# Patient Record
Sex: FEMALE | Employment: FULL TIME | URBAN - METROPOLITAN AREA
[De-identification: names, ages, dates, MRNs, and addresses within clinical notes are randomized per-mention and may not be internally consistent; named-entity substitution may affect disease eponyms.]

---

## 2024-10-18 ENCOUNTER — OFFICE VISIT (OUTPATIENT)
Age: 39
End: 2024-10-18
Payer: COMMERCIAL

## 2024-10-18 VITALS
DIASTOLIC BLOOD PRESSURE: 70 MMHG | TEMPERATURE: 98.2 F | HEIGHT: 65 IN | HEART RATE: 99 BPM | OXYGEN SATURATION: 100 % | SYSTOLIC BLOOD PRESSURE: 120 MMHG | BODY MASS INDEX: 22.66 KG/M2 | WEIGHT: 136 LBS

## 2024-10-18 DIAGNOSIS — J45.40 MODERATE PERSISTENT ASTHMATIC BRONCHITIS WITHOUT COMPLICATION: ICD-10-CM

## 2024-10-18 DIAGNOSIS — J45.20 MILD INTERMITTENT ASTHMA, UNSPECIFIED WHETHER COMPLICATED: Primary | ICD-10-CM

## 2024-10-18 PROBLEM — J45.909 ASTHMATIC BRONCHITIS WITHOUT COMPLICATION: Status: ACTIVE | Noted: 2024-10-18

## 2024-10-18 PROCEDURE — 99204 OFFICE O/P NEW MOD 45 MIN: CPT | Performed by: INTERNAL MEDICINE

## 2024-10-18 PROCEDURE — 90471 IMMUNIZATION ADMIN: CPT

## 2024-10-18 PROCEDURE — 90673 RIV3 VACCINE NO PRESERV IM: CPT

## 2024-10-18 RX ORDER — LORATADINE 10 MG/1
10 TABLET ORAL DAILY
COMMUNITY

## 2024-10-18 RX ORDER — BUDESONIDE AND FORMOTEROL FUMARATE DIHYDRATE 80; 4.5 UG/1; UG/1
2 AEROSOL RESPIRATORY (INHALATION) AS NEEDED
COMMUNITY
End: 2024-10-18

## 2024-10-18 RX ORDER — AZELASTINE HYDROCHLORIDE 0.5 MG/ML
1 SOLUTION/ DROPS OPHTHALMIC 2 TIMES DAILY
COMMUNITY

## 2024-10-18 RX ORDER — ALBUTEROL SULFATE 90 UG/1
2 INHALANT RESPIRATORY (INHALATION) EVERY 6 HOURS PRN
Qty: 18 G | Refills: 5 | Status: SHIPPED | OUTPATIENT
Start: 2024-10-18

## 2024-10-18 RX ORDER — BUDESONIDE AND FORMOTEROL FUMARATE DIHYDRATE 160; 4.5 UG/1; UG/1
2 AEROSOL RESPIRATORY (INHALATION) 2 TIMES DAILY
Start: 2024-10-18

## 2024-10-18 RX ORDER — ALBUTEROL SULFATE 90 UG/1
2 INHALANT RESPIRATORY (INHALATION) EVERY 6 HOURS PRN
COMMUNITY
End: 2024-10-18 | Stop reason: SDUPTHER

## 2024-10-18 NOTE — ASSESSMENT & PLAN NOTE
Bekah and I discussed her history.  She has had no pulmonary problems up until 2022.  I think the episode of COVID caused some airway inflammation that was propagated by multiple subsequent infections and now she is suffering from asthmatic bronchitis.  I would like her to take Symbicort 2 puffs twice a day and reinforced proper inhaler technique.  I asked her to rinse her mouth out after each dose and follow-up with me in 3 months to consider de-escalation.  If her symptoms are stable and she has had no recurrent issues we can discuss de-escalation of Symbicort and wean her off of this.  She may in the future need to be on inhaled steroids surrounding URI or other triggers.  She certainly seems to have allergies per her allergy panel as well as eosinophilia setting her up for chronic asthma.

## 2024-10-18 NOTE — PROGRESS NOTES
Ambulatory Visit  Name: Elba Marte      : 1985      MRN: [id number]  Encounter Provider: Brooklyn Kern DO  Encounter Date: 10/18/2024   Encounter department: [facility] [place]    Assessment & Plan  Mild intermittent asthma, unspecified whether complicated    Orders:    POCT spirometry    budesonide-formoterol (Symbicort) 160-4.5 mcg/act inhaler; Inhale 2 puffs 2 (two) times a day Rinse mouth after use.    influenza vaccine, recombinant, PF, 0.5 mL IM (Flublok)    albuterol (PROVENTIL HFA,VENTOLIN HFA) 90 mcg/act inhaler; Inhale 2 puffs every 6 (six) hours as needed for wheezing    Moderate persistent asthmatic bronchitis without complication  Bekah and I discussed her history.  She has had no pulmonary problems up until .  I think the episode of COVID caused some airway inflammation that was propagated by multiple subsequent infections and now she is suffering from asthmatic bronchitis.  I would like her to take Symbicort 2 puffs twice a day and reinforced proper inhaler technique.  I asked her to rinse her mouth out after each dose and follow-up with me in 3 months to consider de-escalation.  If her symptoms are stable and she has had no recurrent issues we can discuss de-escalation of Symbicort and wean her off of this.  She may in the future need to be on inhaled steroids surrounding URI or other triggers.  She certainly seems to have allergies per her allergy panel as well as eosinophilia setting her up for chronic asthma.           History of Present Illness     Elba Marte is a 39 y.o. female who presents for evaluation of airway URIs and pneumonia.  She had no past medical history up until  when she developed COVID.  Over the past 18 months she has had 3-4 episodes of bronchitis, influenza and 1 severe pneumonia after influenza that left her almost hospitalized.  She has a chronic cough and occasional wheezing.  She lives at home with 3 pets 2 dogs and a cat and is  "a non-smoker.  She works from home and is exposed to her allergens throughout the day and evening with known cat and dog allergies.    History obtained from : patient  Review of Systems   Constitutional: Negative.  Negative for appetite change, fever and unexpected weight change.   HENT:  Positive for postnasal drip, rhinorrhea and sneezing. Negative for ear pain, sore throat and trouble swallowing.    Eyes: Negative.    Respiratory:  Positive for cough and wheezing. Negative for shortness of breath.    Cardiovascular: Negative.  Negative for chest pain and leg swelling.   Gastrointestinal: Negative.    Endocrine: Negative.    Genitourinary: Negative.    Musculoskeletal: Negative.  Negative for myalgias.   Allergic/Immunologic: Negative.    Neurological: Negative.  Negative for headaches.   Hematological: Negative.      Medical History Reviewed by provider this encounter:           Objective     /70 (BP Location: Left arm, Patient Position: Sitting, Cuff Size: Standard)   Pulse 99   Temp 98.2 °F (36.8 °C) (Tympanic)   Ht 5' 5\" (1.651 m)   Wt 61.7 kg (136 lb)   SpO2 100%   BMI 22.63 kg/m²     Physical Exam  Constitutional:       Appearance: She is well-developed.   HENT:      Head: Normocephalic and atraumatic.   Eyes:      Pupils: Pupils are equal, round, and reactive to light.   Cardiovascular:      Rate and Rhythm: Normal rate and regular rhythm.      Heart sounds: No murmur heard.  Pulmonary:      Effort: Pulmonary effort is normal. No respiratory distress.      Breath sounds: Normal breath sounds. No wheezing or rales.   Abdominal:      Palpations: Abdomen is soft.   Musculoskeletal:      Cervical back: Normal range of motion and neck supple.   Skin:     General: Skin is warm and dry.   Neurological:      Mental Status: She is alert and oriented to person, place, and time.       Administrative Statements   I have spent a total time of 40 minutes in caring for this patient on the day of the " visit/encounter including Diagnostic results, Prognosis, Patient and family education, Counseling / Coordination of care, and Reviewing / ordering tests, medicine, procedures  .  Answers submitted by the patient for this visit:  Pulmonology Questionnaire (Submitted on 10/16/2024)  Chief Complaint: Primary symptoms  Chronicity: recurrent  When did you first notice your symptoms?: more than 1 year ago  How often do your symptoms occur?: intermittently  Since you first noticed this problem, how has it changed?: gradually improving  Do you have shortness of breath that occurs with effort or exertion?: Yes  Do you have ear congestion?: No  Do you have heartburn?: No  Do you have fatigue?: No  Do you have nasal congestion?: Yes  Do you have shortness of breath when lying flat?: No  Do you have shortness of breath when you wake up?: No  Do you have sweats?: No  Have you experienced weight loss?: No  Which of the following makes your symptoms worse?: animal exposure, change in weather, exercise, pollen  Which of the following makes your symptoms better?: oral steroids, OTC inhaler, steroid inhaler  Risk factors for lung disease: animal exposure

## 2025-01-14 ENCOUNTER — OFFICE VISIT (OUTPATIENT)
Age: 40
End: 2025-01-14
Payer: COMMERCIAL

## 2025-01-14 VITALS
HEART RATE: 74 BPM | WEIGHT: 134 LBS | BODY MASS INDEX: 22.33 KG/M2 | DIASTOLIC BLOOD PRESSURE: 78 MMHG | OXYGEN SATURATION: 98 % | TEMPERATURE: 97.9 F | SYSTOLIC BLOOD PRESSURE: 122 MMHG | HEIGHT: 65 IN

## 2025-01-14 DIAGNOSIS — J45.40 MODERATE PERSISTENT ASTHMATIC BRONCHITIS WITHOUT COMPLICATION: Primary | ICD-10-CM

## 2025-01-14 PROCEDURE — 99214 OFFICE O/P EST MOD 30 MIN: CPT | Performed by: INTERNAL MEDICINE

## 2025-01-14 NOTE — PROGRESS NOTES
"Name: Elba Marte      : 1985      MRN: [id number]  Encounter Provider: Brooklyn Kern DO  Encounter Date: 2025   [facility]  :  Assessment & Plan  Moderate persistent asthmatic bronchitis without complication  Elba feels much better is essentially asymptomatic since starting Symbicort.  She takes it very regularly and does not miss a dose.  We talked about de-escalation of therapy and agreed on 1 puff twice a day for the next 3 months.  She will come back in 3 months at which time we will check an FeNO and decide on further de-escalation but versus continuing care.  I reviewed her chest x-ray lab work and vaccination record.             History of Present Illness   primary symptoms  Pertinent negatives include no chest pain or coughing.     Elba Marte is a 39 y.o. female who presents for follow-up of her asthmatic bronchitis.  Since last visit she has been taking Symbicort 2 puffs twice a day and has had resolution of her symptoms.  She denies the use of any rescue inhaler no wheezing or shortness of breath.  History obtained from: patient    Review of Systems   Constitutional: Negative.  Negative for unexpected weight change.   HENT: Negative.  Negative for postnasal drip.    Eyes: Negative.    Respiratory: Negative.  Negative for cough, shortness of breath and wheezing.    Cardiovascular: Negative.  Negative for chest pain and leg swelling.   Gastrointestinal: Negative.    Endocrine: Negative.    Genitourinary: Negative.    Musculoskeletal: Negative.    Allergic/Immunologic: Negative.    Neurological: Negative.    Hematological: Negative.      Medical History Reviewed by provider this encounter:     .     Objective   /78 (BP Location: Left arm, Patient Position: Sitting, Cuff Size: Standard)   Pulse 74   Temp 97.9 °F (36.6 °C) (Tympanic)   Ht 5' 5\" (1.651 m)   Wt 60.8 kg (134 lb)   SpO2 98%   BMI 22.30 kg/m²      Physical " Exam  Constitutional:       Appearance: She is well-developed.   HENT:      Head: Normocephalic and atraumatic.   Eyes:      Pupils: Pupils are equal, round, and reactive to light.   Cardiovascular:      Rate and Rhythm: Normal rate and regular rhythm.      Heart sounds: No murmur heard.  Pulmonary:      Effort: Pulmonary effort is normal. No respiratory distress.      Breath sounds: Normal breath sounds. No wheezing or rales.   Abdominal:      Palpations: Abdomen is soft.   Musculoskeletal:      Cervical back: Normal range of motion and neck supple.   Skin:     General: Skin is warm and dry.   Neurological:      Mental Status: She is alert and oriented to person, place, and time.         Administrative Statements   I have spent a total time of 30 minutes in caring for this patient on the day of the visit/encounter including Diagnostic results, Prognosis, Importance of tx compliance, Documenting in the medical record, and Obtaining or reviewing history  .   Answers submitted by the patient for this visit:  Pulmonology Questionnaire (Submitted on 1/7/2025)  Chief Complaint: Primary symptoms  Chronicity: chronic  When did you first notice your symptoms?: more than 1 year ago  How often do your symptoms occur?: rarely  Since you first noticed this problem, how has it changed?: gradually improving  Do you have shortness of breath that occurs with effort or exertion?: No  Do you have ear congestion?: No  Do you have heartburn?: No  Do you have fatigue?: No  Do you have nasal congestion?: No  Do you have shortness of breath when lying flat?: No  Do you have shortness of breath when you wake up?: No  Do you have sweats?: No  Have you experienced weight loss?: No  Which of the following makes your symptoms worse?: nothing  Which of the following makes your symptoms better?: steroid inhaler  Risk factors for lung disease: animal exposure

## 2025-01-14 NOTE — ASSESSMENT & PLAN NOTE
Elba feels much better is essentially asymptomatic since starting Symbicort.  She takes it very regularly and does not miss a dose.  We talked about de-escalation of therapy and agreed on 1 puff twice a day for the next 3 months.  She will come back in 3 months at which time we will check an FeNO and decide on further de-escalation but versus continuing care.  I reviewed her chest x-ray lab work and vaccination record.

## 2025-03-05 ENCOUNTER — ANNUAL EXAM (OUTPATIENT)
Age: 40
End: 2025-03-05
Payer: COMMERCIAL

## 2025-03-05 VITALS
WEIGHT: 130.4 LBS | HEIGHT: 65 IN | BODY MASS INDEX: 21.73 KG/M2 | DIASTOLIC BLOOD PRESSURE: 64 MMHG | SYSTOLIC BLOOD PRESSURE: 118 MMHG

## 2025-03-05 DIAGNOSIS — Z12.31 ENCOUNTER FOR SCREENING MAMMOGRAM FOR MALIGNANT NEOPLASM OF BREAST: ICD-10-CM

## 2025-03-05 DIAGNOSIS — Z01.419 ENCOUNTER FOR ANNUAL ROUTINE GYNECOLOGICAL EXAMINATION: Primary | ICD-10-CM

## 2025-03-05 PROCEDURE — S0610 ANNUAL GYNECOLOGICAL EXAMINA: HCPCS | Performed by: OBSTETRICS & GYNECOLOGY

## 2025-03-05 RX ORDER — MELOXICAM 15 MG/1
15 TABLET ORAL DAILY
COMMUNITY

## 2025-03-05 NOTE — PROGRESS NOTES
Assessment/Plan:    1. Encounter for annual routine gynecological examination (Primary)      2. Encounter for screening mammogram for malignant neoplasm of breast    - Mammo screening bilateral w 3d and cad; Future        Subjective      Elba Marte is a 39 y.o. female who presents for annual exam. Periods are regular.  She denies any urinary or sexual concerns.  She has had some struggles with breast pain; trying inositol.      Current contraception: condoms  History of abnormal Pap smear: yes - ?  Regular self breast exam: yes  History of abnormal mammogram: no  History of abnormal lipids: no    Menstrual History:  OB History          4    Para   2    Term   2       0    AB   2    Living   2         SAB   1    IAB   0    Ectopic   0    Multiple   0    Live Births   2          Menarche age: 12  Patient's last menstrual period was 2025 (exact date).  Period Cycle (Days): 36  Period Duration (Days): 7-9  Period Pattern: Regular  Menstrual Flow: Moderate, Light (Heavy x 1 day)  Menstrual Control: Thin pad (Menstrual cup)  Dysmenorrhea: (!) Mild  Dysmenorrhea Symptoms: Cramping, Other (Comment) (Breast discomfort starts prior to menses onset)    Past Medical History:   Diagnosis Date    Anemia     during second pregnancy    Asthma     Miscarriage 2019    early in pregnancy       Family History   Problem Relation Age of Onset    Hypertension Mother     Hypertension Father     Colon cancer Maternal Grandmother     Stroke Maternal Grandmother     Thyroid disease Maternal Grandmother     Cancer Paternal Grandmother     Thyroid disease Sister        The following portions of the patient's history were reviewed and updated as appropriate: allergies, current medications, past family history, past medical history, past social history, past surgical history, and problem list.    Review of Systems  Pertinent items are noted in HPI.      Objective      /64 (BP Location: Left arm, Patient Position:  "Sitting, Cuff Size: Large)   Ht 5' 4.75\" (1.645 m)   Wt 59.1 kg (130 lb 6.4 oz)   LMP 02/24/2025 (Exact Date)   BMI 21.87 kg/m²     General:   alert and oriented, in no acute distress   Heart:  Breasts: regular rate and rhythm  appear normal, no suspicious masses, no skin or nipple changes or axillary nodes.   Lungs: Effort normal   Abdomen: soft, non-tender, without masses or organomegaly   Vulva: normal   Vagina: normal mucosa   Cervix: no lesions   Uterus: normal size, mobile, non-tender   Adnexa:  Bladder: normal adnexa and no mass, fullness, tenderness  Non-tender, no prolapse               "

## 2025-04-29 ENCOUNTER — OFFICE VISIT (OUTPATIENT)
Age: 40
End: 2025-04-29
Payer: COMMERCIAL

## 2025-04-29 VITALS
DIASTOLIC BLOOD PRESSURE: 72 MMHG | TEMPERATURE: 99.1 F | HEART RATE: 81 BPM | WEIGHT: 128.4 LBS | SYSTOLIC BLOOD PRESSURE: 120 MMHG | BODY MASS INDEX: 21.39 KG/M2 | OXYGEN SATURATION: 98 % | HEIGHT: 65 IN

## 2025-04-29 DIAGNOSIS — J45.20 MILD INTERMITTENT ASTHMA, UNSPECIFIED WHETHER COMPLICATED: ICD-10-CM

## 2025-04-29 DIAGNOSIS — H10.10 ALLERGIC CONJUNCTIVITIS, UNSPECIFIED LATERALITY: ICD-10-CM

## 2025-04-29 DIAGNOSIS — J45.40 MODERATE PERSISTENT ASTHMATIC BRONCHITIS WITHOUT COMPLICATION: Primary | ICD-10-CM

## 2025-04-29 PROCEDURE — 95012 NITRIC OXIDE EXP GAS DETER: CPT | Performed by: INTERNAL MEDICINE

## 2025-04-29 PROCEDURE — 99214 OFFICE O/P EST MOD 30 MIN: CPT | Performed by: INTERNAL MEDICINE

## 2025-04-29 RX ORDER — AZELASTINE HYDROCHLORIDE 0.5 MG/ML
1 SOLUTION/ DROPS OPHTHALMIC 2 TIMES DAILY
Qty: 6 ML | Refills: 5 | Status: SHIPPED | OUTPATIENT
Start: 2025-04-29

## 2025-04-29 RX ORDER — BUDESONIDE AND FORMOTEROL FUMARATE DIHYDRATE 160; 4.5 UG/1; UG/1
2 AEROSOL RESPIRATORY (INHALATION) 2 TIMES DAILY
Qty: 10.2 G | Refills: 3 | Status: SHIPPED | OUTPATIENT
Start: 2025-04-29

## 2025-04-29 NOTE — ASSESSMENT & PLAN NOTE
Ms. Marte is responding favorably to Symbicort.  She tried to wean off of it but had recurrence of her symptoms.  She will maintain on Symbicort 1 puff twice a day as this is what is needed to suppress her symptoms.  That being said her Alan is still elevated so in the future we may need to escalate therapy if she becomes symptomatic.    Orders:    POCT FeNO    azelastine (OPTIVAR) 0.05 % ophthalmic solution; Administer 1 drop to both eyes 2 (two) times a day

## 2025-04-29 NOTE — PROGRESS NOTES
Name: Elba Marte      : 1985      MRN: [id number]  Encounter Provider: Brooklyn Kern DO  Encounter Date: 2025   Encounter department[facility]  :  Assessment & Plan  Moderate persistent asthmatic bronchitis without complication  Ms. Marte is responding favorably to Symbicort.  She tried to wean off of it but had recurrence of her symptoms.  She will maintain on Symbicort 1 puff twice a day as this is what is needed to suppress her symptoms.  That being said her Alan is still elevated so in the future we may need to escalate therapy if she becomes symptomatic.    Orders:    POCT FeNO    azelastine (OPTIVAR) 0.05 % ophthalmic solution; Administer 1 drop to both eyes 2 (two) times a day    Mild intermittent asthma, unspecified whether complicated    Orders:    budesonide-formoterol (Symbicort) 160-4.5 mcg/act inhaler; Inhale 2 puffs 2 (two) times a day Rinse mouth after use.    Allergic conjunctivitis, unspecified laterality    Orders:    azelastine (OPTIVAR) 0.05 % ophthalmic solution; Administer 1 drop to both eyes 2 (two) times a day        History of Present Illness   primary symptoms  Pertinent negatives include no chest pain, fever, headaches, myalgias or sore throat.     Elab Marte is a 39 y.o. female who presents for follow-up of her asthmatic bronchitis.  Her symptoms improved with the addition of Symbicort however when she tried to wean off the Symbicort she was unable to tolerate this back to 1 puff twice daily.  On this dose she has not needed her rescue inhaler.  History obtained from: patient    Review of Systems   Constitutional:  Negative for appetite change and fever.   HENT:  Negative for ear pain, postnasal drip, rhinorrhea, sneezing, sore throat and trouble swallowing.    Respiratory:  Positive for wheezing.    Cardiovascular:  Negative for chest pain.   Musculoskeletal:  Negative for myalgias.   Neurological:  Negative for headaches.     Medical  "History Reviewed by provider this encounter:     .     Objective   /72 (BP Location: Left arm, Patient Position: Sitting, Cuff Size: Standard)   Pulse 81   Temp 99.1 °F (37.3 °C) (Tympanic)   Ht 5' 5\" (1.651 m)   Wt 58.2 kg (128 lb 6.4 oz)   SpO2 98%   BMI 21.37 kg/m²      Physical Exam  Constitutional:       Appearance: She is well-developed.   HENT:      Head: Normocephalic and atraumatic.   Eyes:      Pupils: Pupils are equal, round, and reactive to light.   Cardiovascular:      Rate and Rhythm: Normal rate and regular rhythm.      Heart sounds: No murmur heard.  Pulmonary:      Effort: Pulmonary effort is normal. No respiratory distress.      Breath sounds: Normal breath sounds. No wheezing or rales.   Abdominal:      Palpations: Abdomen is soft.   Musculoskeletal:      Cervical back: Normal range of motion and neck supple.   Skin:     General: Skin is warm and dry.   Neurological:      Mental Status: She is alert and oriented to person, place, and time.         Administrative Statements   I have spent a total time of 30 minutes in caring for this patient on the day of the visit/encounter including Diagnostic results, Prognosis, and Reviewing/placing orders in the medical record (including tests, medications, and/or procedures).  "